# Patient Record
Sex: FEMALE | Race: WHITE | NOT HISPANIC OR LATINO | Employment: FULL TIME | URBAN - METROPOLITAN AREA
[De-identification: names, ages, dates, MRNs, and addresses within clinical notes are randomized per-mention and may not be internally consistent; named-entity substitution may affect disease eponyms.]

---

## 2019-12-10 ENCOUNTER — EH NON-PROVIDER (OUTPATIENT)
Dept: OCCUPATIONAL MEDICINE | Facility: CLINIC | Age: 29
End: 2019-12-10

## 2019-12-10 ENCOUNTER — EMPLOYEE HEALTH (OUTPATIENT)
Dept: OCCUPATIONAL MEDICINE | Facility: CLINIC | Age: 29
End: 2019-12-10

## 2019-12-10 ENCOUNTER — HOSPITAL ENCOUNTER (OUTPATIENT)
Facility: MEDICAL CENTER | Age: 29
End: 2019-12-10
Attending: PREVENTIVE MEDICINE
Payer: COMMERCIAL

## 2019-12-10 VITALS
RESPIRATION RATE: 16 BRPM | OXYGEN SATURATION: 99 % | DIASTOLIC BLOOD PRESSURE: 68 MMHG | HEART RATE: 74 BPM | TEMPERATURE: 97.7 F | HEIGHT: 64 IN | BODY MASS INDEX: 20.25 KG/M2 | SYSTOLIC BLOOD PRESSURE: 122 MMHG | WEIGHT: 118.6 LBS

## 2019-12-10 DIAGNOSIS — Z02.1 PRE-EMPLOYMENT HEALTH SCREENING EXAMINATION: ICD-10-CM

## 2019-12-10 DIAGNOSIS — Z02.1 PRE-EMPLOYMENT DRUG SCREENING: ICD-10-CM

## 2019-12-10 DIAGNOSIS — Z02.1 PRE-EMPLOYMENT DRUG SCREENING: Primary | ICD-10-CM

## 2019-12-10 LAB
AMP AMPHETAMINE: NORMAL
BAR BARBITURATES: NORMAL
BZO BENZODIAZEPINES: NORMAL
COC COCAINE: NORMAL
INT CON NEG: NORMAL
INT CON POS: NORMAL
MDMA ECSTASY: NORMAL
MET METHAMPHETAMINES: NORMAL
MTD METHADONE: NORMAL
OPI OPIATES: NORMAL
OXY OXYCODONE: NORMAL
PCP PHENCYCLIDINE: NORMAL
POC URINE DRUG SCREEN OCDRS: NORMAL
THC: NORMAL

## 2019-12-10 PROCEDURE — 80305 DRUG TEST PRSMV DIR OPT OBS: CPT | Performed by: PREVENTIVE MEDICINE

## 2019-12-10 PROCEDURE — 86480 TB TEST CELL IMMUN MEASURE: CPT | Performed by: PREVENTIVE MEDICINE

## 2019-12-10 PROCEDURE — 8915 PR COMPREHENSIVE PHYSICAL: Performed by: PREVENTIVE MEDICINE

## 2019-12-10 RX ORDER — ACETAMINOPHEN AND CODEINE PHOSPHATE 120; 12 MG/5ML; MG/5ML
SOLUTION ORAL
Refills: 0 | Status: ON HOLD | COMMUNITY
Start: 2019-10-15 | End: 2024-02-15

## 2019-12-10 RX ORDER — LEVOTHYROXINE SODIUM 0.05 MG/1
TABLET ORAL
Refills: 0 | Status: ON HOLD | COMMUNITY
Start: 2019-11-10 | End: 2024-02-15

## 2019-12-10 SDOH — HEALTH STABILITY: MENTAL HEALTH: HOW OFTEN DO YOU HAVE A DRINK CONTAINING ALCOHOL?: NEVER

## 2019-12-20 LAB — TEST NAME 95000: NORMAL

## 2019-12-23 ENCOUNTER — EH NON-PROVIDER (OUTPATIENT)
Dept: OCCUPATIONAL MEDICINE | Facility: CLINIC | Age: 29
End: 2019-12-23

## 2019-12-23 DIAGNOSIS — Z71.85 IMMUNIZATION COUNSELING: ICD-10-CM

## 2020-08-08 ENCOUNTER — HOSPITAL ENCOUNTER (OUTPATIENT)
Dept: LAB | Facility: MEDICAL CENTER | Age: 30
End: 2020-08-08
Payer: COMMERCIAL

## 2020-08-08 LAB
BDY FAT % MEASURED: 17 %
BP DIAS: 80 MMHG
BP SYS: 110 MMHG
CHOLEST SERPL-MCNC: 167 MG/DL (ref 100–199)
DIABETES HTDIA: NO
EVENT NAME HTEVT: NORMAL
FASTING HTFAS: YES
GLUCOSE SERPL-MCNC: 82 MG/DL (ref 65–99)
HDLC SERPL-MCNC: 81 MG/DL
HYPERTENSION HTHYP: NO
LDLC SERPL CALC-MCNC: 80 MG/DL
SCREENING LOC CITY HTCIT: NORMAL
SCREENING LOC STATE HTSTA: NORMAL
SCREENING LOCATION HTLOC: NORMAL
SMOKING HTSMO: NO
SUBSCRIBER ID HTSID: NORMAL
TRIGL SERPL-MCNC: 30 MG/DL (ref 0–149)

## 2020-08-08 PROCEDURE — 36415 COLL VENOUS BLD VENIPUNCTURE: CPT

## 2020-08-08 PROCEDURE — S5190 WELLNESS ASSESSMENT BY NONPH: HCPCS

## 2020-08-08 PROCEDURE — 80061 LIPID PANEL: CPT

## 2020-08-08 PROCEDURE — 82947 ASSAY GLUCOSE BLOOD QUANT: CPT

## 2020-09-25 ENCOUNTER — IMMUNIZATION (OUTPATIENT)
Dept: OCCUPATIONAL MEDICINE | Facility: CLINIC | Age: 30
End: 2020-09-25

## 2020-09-25 DIAGNOSIS — Z23 NEED FOR VACCINATION: ICD-10-CM

## 2020-09-25 PROCEDURE — 90686 IIV4 VACC NO PRSV 0.5 ML IM: CPT | Performed by: NURSE PRACTITIONER

## 2020-12-20 ENCOUNTER — APPOINTMENT (OUTPATIENT)
Dept: FAMILY PLANNING/WOMEN'S HEALTH CLINIC | Facility: IMMUNIZATION CENTER | Age: 30
End: 2020-12-20
Attending: FAMILY MEDICINE
Payer: COMMERCIAL

## 2020-12-20 DIAGNOSIS — Z23 NEED FOR VACCINATION: ICD-10-CM

## 2020-12-20 PROCEDURE — 0001A PFIZER SARS-COV-2 VACCINE: CPT

## 2020-12-20 PROCEDURE — 91300 PFIZER SARS-COV-2 VACCINE: CPT

## 2020-12-21 ENCOUNTER — IMMUNIZATION (OUTPATIENT)
Dept: FAMILY PLANNING/WOMEN'S HEALTH CLINIC | Facility: IMMUNIZATION CENTER | Age: 30
End: 2020-12-21
Payer: COMMERCIAL

## 2020-12-21 DIAGNOSIS — Z23 ENCOUNTER FOR VACCINATION: Primary | ICD-10-CM

## 2021-01-10 ENCOUNTER — IMMUNIZATION (OUTPATIENT)
Dept: FAMILY PLANNING/WOMEN'S HEALTH CLINIC | Facility: IMMUNIZATION CENTER | Age: 31
End: 2021-01-10
Attending: FAMILY MEDICINE
Payer: COMMERCIAL

## 2021-01-10 DIAGNOSIS — Z23 ENCOUNTER FOR VACCINATION: Primary | ICD-10-CM

## 2021-01-10 PROCEDURE — 0002A PFIZER SARS-COV-2 VACCINE: CPT

## 2021-01-10 PROCEDURE — 91300 PFIZER SARS-COV-2 VACCINE: CPT

## 2021-01-18 ENCOUNTER — HOSPITAL ENCOUNTER (OUTPATIENT)
Dept: LAB | Facility: MEDICAL CENTER | Age: 31
End: 2021-01-18
Attending: FAMILY MEDICINE
Payer: COMMERCIAL

## 2021-01-18 LAB
T4 FREE SERPL-MCNC: 1.52 NG/DL (ref 0.93–1.7)
TSH SERPL DL<=0.005 MIU/L-ACNC: 0.75 UIU/ML (ref 0.38–5.33)

## 2021-01-18 PROCEDURE — 84443 ASSAY THYROID STIM HORMONE: CPT

## 2021-01-18 PROCEDURE — 84439 ASSAY OF FREE THYROXINE: CPT

## 2021-01-18 PROCEDURE — 36415 COLL VENOUS BLD VENIPUNCTURE: CPT

## 2021-04-30 ENCOUNTER — HOSPITAL ENCOUNTER (OUTPATIENT)
Dept: LAB | Facility: MEDICAL CENTER | Age: 31
End: 2021-04-30
Attending: FAMILY MEDICINE
Payer: COMMERCIAL

## 2021-04-30 LAB
T4 FREE SERPL-MCNC: 1.32 NG/DL (ref 0.93–1.7)
TSH SERPL DL<=0.005 MIU/L-ACNC: 1.53 UIU/ML (ref 0.38–5.33)

## 2021-04-30 PROCEDURE — 36415 COLL VENOUS BLD VENIPUNCTURE: CPT

## 2021-04-30 PROCEDURE — 84439 ASSAY OF FREE THYROXINE: CPT

## 2021-04-30 PROCEDURE — 84443 ASSAY THYROID STIM HORMONE: CPT

## 2021-09-24 ENCOUNTER — IMMUNIZATION (OUTPATIENT)
Dept: OCCUPATIONAL MEDICINE | Facility: CLINIC | Age: 31
End: 2021-09-24
Payer: COMMERCIAL

## 2021-09-24 DIAGNOSIS — Z23 NEED FOR VACCINATION: Primary | ICD-10-CM

## 2021-09-24 PROCEDURE — 90686 IIV4 VACC NO PRSV 0.5 ML IM: CPT | Performed by: NURSE PRACTITIONER

## 2023-10-11 ENCOUNTER — ANCILLARY PROCEDURE (OUTPATIENT)
Dept: MATERNAL FETAL MEDICINE | Facility: MEDICAL CENTER | Age: 33
End: 2023-10-11
Payer: COMMERCIAL

## 2023-10-11 VITALS — SYSTOLIC BLOOD PRESSURE: 125 MMHG | BODY MASS INDEX: 23.48 KG/M2 | DIASTOLIC BLOOD PRESSURE: 77 MMHG | WEIGHT: 136.8 LBS

## 2023-10-11 DIAGNOSIS — Z3A.23 23 WEEKS GESTATION OF PREGNANCY: ICD-10-CM

## 2023-10-11 DIAGNOSIS — O35.BXX0 FETAL CARDIAC ANOMALY COMPLICATING PREGNANCY, ANTEPARTUM, NOT APPLICABLE OR UNSPECIFIED FETUS: ICD-10-CM

## 2023-10-11 DIAGNOSIS — O28.3 ABNORMAL FETAL ULTRASOUND: ICD-10-CM

## 2023-10-11 PROCEDURE — 76817 TRANSVAGINAL US OBSTETRIC: CPT | Performed by: OBSTETRICS & GYNECOLOGY

## 2023-10-11 PROCEDURE — 76811 OB US DETAILED SNGL FETUS: CPT | Performed by: OBSTETRICS & GYNECOLOGY

## 2023-10-11 PROCEDURE — 99202 OFFICE O/P NEW SF 15 MIN: CPT | Performed by: OBSTETRICS & GYNECOLOGY

## 2024-01-24 ENCOUNTER — HOSPITAL ENCOUNTER (OUTPATIENT)
Facility: MEDICAL CENTER | Age: 34
End: 2024-01-24
Payer: COMMERCIAL

## 2024-02-14 ENCOUNTER — HOSPITAL ENCOUNTER (INPATIENT)
Facility: MEDICAL CENTER | Age: 34
LOS: 1 days | End: 2024-02-15
Attending: FAMILY MEDICINE | Admitting: FAMILY MEDICINE
Payer: COMMERCIAL

## 2024-02-14 ENCOUNTER — ANESTHESIA (OUTPATIENT)
Dept: ANESTHESIOLOGY | Facility: MEDICAL CENTER | Age: 34
End: 2024-02-14
Payer: COMMERCIAL

## 2024-02-14 ENCOUNTER — ANESTHESIA EVENT (OUTPATIENT)
Dept: ANESTHESIOLOGY | Facility: MEDICAL CENTER | Age: 34
End: 2024-02-14
Payer: COMMERCIAL

## 2024-02-14 LAB
BASOPHILS # BLD AUTO: 0.3 % (ref 0–1.8)
BASOPHILS # BLD: 0.04 K/UL (ref 0–0.12)
EOSINOPHIL # BLD AUTO: 0.06 K/UL (ref 0–0.51)
EOSINOPHIL NFR BLD: 0.5 % (ref 0–6.9)
ERYTHROCYTE [DISTWIDTH] IN BLOOD BY AUTOMATED COUNT: 43.4 FL (ref 35.9–50)
ERYTHROCYTE [DISTWIDTH] IN BLOOD BY AUTOMATED COUNT: 43.9 FL (ref 35.9–50)
HCT VFR BLD AUTO: 34.2 % (ref 37–47)
HCT VFR BLD AUTO: 38.8 % (ref 37–47)
HGB BLD-MCNC: 12.1 G/DL (ref 12–16)
HGB BLD-MCNC: 13.5 G/DL (ref 12–16)
HOLDING TUBE BB 8507: NORMAL
IMM GRANULOCYTES # BLD AUTO: 0.1 K/UL (ref 0–0.11)
IMM GRANULOCYTES NFR BLD AUTO: 0.9 % (ref 0–0.9)
LYMPHOCYTES # BLD AUTO: 1.92 K/UL (ref 1–4.8)
LYMPHOCYTES NFR BLD: 16.7 % (ref 22–41)
MCH RBC QN AUTO: 32.1 PG (ref 27–33)
MCH RBC QN AUTO: 33 PG (ref 27–33)
MCHC RBC AUTO-ENTMCNC: 34.8 G/DL (ref 32.2–35.5)
MCHC RBC AUTO-ENTMCNC: 35.4 G/DL (ref 32.2–35.5)
MCV RBC AUTO: 92.2 FL (ref 81.4–97.8)
MCV RBC AUTO: 93.2 FL (ref 81.4–97.8)
MONOCYTES # BLD AUTO: 0.82 K/UL (ref 0–0.85)
MONOCYTES NFR BLD AUTO: 7.1 % (ref 0–13.4)
NEUTROPHILS # BLD AUTO: 8.59 K/UL (ref 1.82–7.42)
NEUTROPHILS NFR BLD: 74.5 % (ref 44–72)
NRBC # BLD AUTO: 0 K/UL
NRBC BLD-RTO: 0 /100 WBC (ref 0–0.2)
PLATELET # BLD AUTO: 179 K/UL (ref 164–446)
PLATELET # BLD AUTO: 198 K/UL (ref 164–446)
PMV BLD AUTO: 10.2 FL (ref 9–12.9)
PMV BLD AUTO: 10.2 FL (ref 9–12.9)
RBC # BLD AUTO: 3.67 M/UL (ref 4.2–5.4)
RBC # BLD AUTO: 4.21 M/UL (ref 4.2–5.4)
T PALLIDUM AB SER QL IA: NORMAL
WBC # BLD AUTO: 11.5 K/UL (ref 4.8–10.8)
WBC # BLD AUTO: 14.2 K/UL (ref 4.8–10.8)

## 2024-02-14 PROCEDURE — 85027 COMPLETE CBC AUTOMATED: CPT

## 2024-02-14 PROCEDURE — A9270 NON-COVERED ITEM OR SERVICE: HCPCS | Performed by: NURSE PRACTITIONER

## 2024-02-14 PROCEDURE — 59409 OBSTETRICAL CARE: CPT | Performed by: NURSE PRACTITIONER

## 2024-02-14 PROCEDURE — 700101 HCHG RX REV CODE 250: Performed by: ANESTHESIOLOGY

## 2024-02-14 PROCEDURE — 36415 COLL VENOUS BLD VENIPUNCTURE: CPT

## 2024-02-14 PROCEDURE — 700111 HCHG RX REV CODE 636 W/ 250 OVERRIDE (IP): Mod: JZ | Performed by: ANESTHESIOLOGY

## 2024-02-14 PROCEDURE — 0KQM0ZZ REPAIR PERINEUM MUSCLE, OPEN APPROACH: ICD-10-PCS | Performed by: FAMILY MEDICINE

## 2024-02-14 PROCEDURE — 700105 HCHG RX REV CODE 258: Performed by: FAMILY MEDICINE

## 2024-02-14 PROCEDURE — 700102 HCHG RX REV CODE 250 W/ 637 OVERRIDE(OP): Performed by: NURSE PRACTITIONER

## 2024-02-14 PROCEDURE — 700111 HCHG RX REV CODE 636 W/ 250 OVERRIDE (IP): Performed by: FAMILY MEDICINE

## 2024-02-14 PROCEDURE — 304965 HCHG RECOVERY SERVICES

## 2024-02-14 PROCEDURE — 700111 HCHG RX REV CODE 636 W/ 250 OVERRIDE (IP): Performed by: ANESTHESIOLOGY

## 2024-02-14 PROCEDURE — 770002 HCHG ROOM/CARE - OB PRIVATE (112)

## 2024-02-14 PROCEDURE — 85025 COMPLETE CBC W/AUTO DIFF WBC: CPT

## 2024-02-14 PROCEDURE — 303615 HCHG EPIDURAL/SPINAL ANESTHESIA FOR LABOR

## 2024-02-14 PROCEDURE — 59409 OBSTETRICAL CARE: CPT

## 2024-02-14 PROCEDURE — 86780 TREPONEMA PALLIDUM: CPT

## 2024-02-14 RX ORDER — SODIUM CHLORIDE, SODIUM LACTATE, POTASSIUM CHLORIDE, CALCIUM CHLORIDE 600; 310; 30; 20 MG/100ML; MG/100ML; MG/100ML; MG/100ML
INJECTION, SOLUTION INTRAVENOUS CONTINUOUS
Status: DISCONTINUED | OUTPATIENT
Start: 2024-02-14 | End: 2024-02-15 | Stop reason: HOSPADM

## 2024-02-14 RX ORDER — CARBOPROST TROMETHAMINE 250 UG/ML
250 INJECTION, SOLUTION INTRAMUSCULAR
Status: DISCONTINUED | OUTPATIENT
Start: 2024-02-14 | End: 2024-02-15 | Stop reason: HOSPADM

## 2024-02-14 RX ORDER — ROPIVACAINE HYDROCHLORIDE 2 MG/ML
INJECTION, SOLUTION EPIDURAL; INFILTRATION; PERINEURAL
Status: ACTIVE
Start: 2024-02-14 | End: 2024-02-14

## 2024-02-14 RX ORDER — MISOPROSTOL 200 UG/1
600 TABLET ORAL
Status: DISCONTINUED | OUTPATIENT
Start: 2024-02-14 | End: 2024-02-15 | Stop reason: HOSPADM

## 2024-02-14 RX ORDER — ROPIVACAINE HYDROCHLORIDE 2 MG/ML
INJECTION, SOLUTION EPIDURAL; INFILTRATION; PERINEURAL CONTINUOUS
Status: DISCONTINUED | OUTPATIENT
Start: 2024-02-14 | End: 2024-02-15 | Stop reason: HOSPADM

## 2024-02-14 RX ORDER — ONDANSETRON 2 MG/ML
4 INJECTION INTRAMUSCULAR; INTRAVENOUS EVERY 6 HOURS PRN
Status: DISCONTINUED | OUTPATIENT
Start: 2024-02-14 | End: 2024-02-14 | Stop reason: HOSPADM

## 2024-02-14 RX ORDER — LIDOCAINE HYDROCHLORIDE 10 MG/ML
20 INJECTION, SOLUTION INFILTRATION; PERINEURAL
Status: DISCONTINUED | OUTPATIENT
Start: 2024-02-14 | End: 2024-02-14 | Stop reason: HOSPADM

## 2024-02-14 RX ORDER — IBUPROFEN 800 MG/1
800 TABLET ORAL
Status: DISCONTINUED | OUTPATIENT
Start: 2024-02-14 | End: 2024-02-14 | Stop reason: HOSPADM

## 2024-02-14 RX ORDER — IBUPROFEN 800 MG/1
800 TABLET ORAL EVERY 8 HOURS PRN
Status: DISCONTINUED | OUTPATIENT
Start: 2024-02-14 | End: 2024-02-15 | Stop reason: HOSPADM

## 2024-02-14 RX ORDER — BUPIVACAINE HYDROCHLORIDE 2.5 MG/ML
INJECTION, SOLUTION EPIDURAL; INFILTRATION; INTRACAUDAL
Status: ACTIVE
Start: 2024-02-14 | End: 2024-02-14

## 2024-02-14 RX ORDER — ACETAMINOPHEN 500 MG
1000 TABLET ORAL
Status: DISCONTINUED | OUTPATIENT
Start: 2024-02-14 | End: 2024-02-14 | Stop reason: HOSPADM

## 2024-02-14 RX ORDER — BUPIVACAINE HYDROCHLORIDE 2.5 MG/ML
INJECTION, SOLUTION EPIDURAL; INFILTRATION; INTRACAUDAL
Status: COMPLETED | OUTPATIENT
Start: 2024-02-14 | End: 2024-02-14

## 2024-02-14 RX ORDER — LIDOCAINE HYDROCHLORIDE AND EPINEPHRINE 15; 5 MG/ML; UG/ML
INJECTION, SOLUTION EPIDURAL
Status: COMPLETED | OUTPATIENT
Start: 2024-02-14 | End: 2024-02-14

## 2024-02-14 RX ORDER — SODIUM CHLORIDE, SODIUM LACTATE, POTASSIUM CHLORIDE, AND CALCIUM CHLORIDE .6; .31; .03; .02 G/100ML; G/100ML; G/100ML; G/100ML
250 INJECTION, SOLUTION INTRAVENOUS PRN
Status: DISCONTINUED | OUTPATIENT
Start: 2024-02-14 | End: 2024-02-14 | Stop reason: HOSPADM

## 2024-02-14 RX ORDER — METHYLERGONOVINE MALEATE 0.2 MG/ML
0.2 INJECTION INTRAVENOUS
Status: DISCONTINUED | OUTPATIENT
Start: 2024-02-14 | End: 2024-02-15 | Stop reason: HOSPADM

## 2024-02-14 RX ORDER — ONDANSETRON 4 MG/1
4 TABLET, ORALLY DISINTEGRATING ORAL EVERY 6 HOURS PRN
Status: DISCONTINUED | OUTPATIENT
Start: 2024-02-14 | End: 2024-02-14 | Stop reason: HOSPADM

## 2024-02-14 RX ORDER — SODIUM CHLORIDE, SODIUM LACTATE, POTASSIUM CHLORIDE, CALCIUM CHLORIDE 600; 310; 30; 20 MG/100ML; MG/100ML; MG/100ML; MG/100ML
INJECTION, SOLUTION INTRAVENOUS PRN
Status: DISCONTINUED | OUTPATIENT
Start: 2024-02-14 | End: 2024-02-15 | Stop reason: HOSPADM

## 2024-02-14 RX ORDER — OXYTOCIN 10 [USP'U]/ML
10 INJECTION, SOLUTION INTRAMUSCULAR; INTRAVENOUS
Status: DISCONTINUED | OUTPATIENT
Start: 2024-02-14 | End: 2024-02-14 | Stop reason: HOSPADM

## 2024-02-14 RX ORDER — TERBUTALINE SULFATE 1 MG/ML
0.25 INJECTION, SOLUTION SUBCUTANEOUS
Status: DISCONTINUED | OUTPATIENT
Start: 2024-02-14 | End: 2024-02-14 | Stop reason: HOSPADM

## 2024-02-14 RX ORDER — EPHEDRINE SULFATE 50 MG/ML
5 INJECTION, SOLUTION INTRAVENOUS
Status: DISCONTINUED | OUTPATIENT
Start: 2024-02-14 | End: 2024-02-14 | Stop reason: HOSPADM

## 2024-02-14 RX ORDER — DOCUSATE SODIUM 100 MG/1
100 CAPSULE, LIQUID FILLED ORAL 2 TIMES DAILY PRN
Status: DISCONTINUED | OUTPATIENT
Start: 2024-02-14 | End: 2024-02-15 | Stop reason: HOSPADM

## 2024-02-14 RX ORDER — ACETAMINOPHEN 500 MG
1000 TABLET ORAL EVERY 6 HOURS PRN
Status: DISCONTINUED | OUTPATIENT
Start: 2024-02-14 | End: 2024-02-15 | Stop reason: HOSPADM

## 2024-02-14 RX ORDER — SODIUM CHLORIDE, SODIUM LACTATE, POTASSIUM CHLORIDE, AND CALCIUM CHLORIDE .6; .31; .03; .02 G/100ML; G/100ML; G/100ML; G/100ML
1000 INJECTION, SOLUTION INTRAVENOUS
Status: DISCONTINUED | OUTPATIENT
Start: 2024-02-14 | End: 2024-02-14 | Stop reason: HOSPADM

## 2024-02-14 RX ADMIN — IBUPROFEN 800 MG: 800 TABLET, FILM COATED ORAL at 09:41

## 2024-02-14 RX ADMIN — OXYTOCIN 20 UNITS: 10 INJECTION, SOLUTION INTRAMUSCULAR; INTRAVENOUS at 06:50

## 2024-02-14 RX ADMIN — BUPIVACAINE HYDROCHLORIDE 7 ML: 2.5 INJECTION, SOLUTION EPIDURAL; INFILTRATION; INTRACAUDAL at 02:30

## 2024-02-14 RX ADMIN — ROPIVACAINE HYDROCHLORIDE: 2 INJECTION, SOLUTION EPIDURAL; INFILTRATION at 02:30

## 2024-02-14 RX ADMIN — IBUPROFEN 800 MG: 800 TABLET, FILM COATED ORAL at 17:55

## 2024-02-14 RX ADMIN — LIDOCAINE HYDROCHLORIDE,EPINEPHRINE BITARTRATE 5 ML: 15; .005 INJECTION, SOLUTION EPIDURAL; INFILTRATION; INTRACAUDAL; PERINEURAL at 02:30

## 2024-02-14 ASSESSMENT — EDINBURGH POSTNATAL DEPRESSION SCALE (EPDS)
THINGS HAVE BEEN GETTING ON TOP OF ME: NO, I HAVE BEEN COPING AS WELL AS EVER
I HAVE BEEN SO UNHAPPY THAT I HAVE BEEN CRYING: NO, NEVER
I HAVE BLAMED MYSELF UNNECESSARILY WHEN THINGS WENT WRONG: NOT VERY OFTEN
I HAVE FELT SCARED OR PANICKY FOR NO GOOD REASON: NO, NOT AT ALL
I HAVE FELT SAD OR MISERABLE: NO, NOT AT ALL
I HAVE BEEN SO UNHAPPY THAT I HAVE HAD DIFFICULTY SLEEPING: NOT AT ALL
I HAVE BEEN ABLE TO LAUGH AND SEE THE FUNNY SIDE OF THINGS: AS MUCH AS I ALWAYS COULD
I HAVE LOOKED FORWARD WITH ENJOYMENT TO THINGS: AS MUCH AS I EVER DID
I HAVE BEEN ANXIOUS OR WORRIED FOR NO GOOD REASON: HARDLY EVER
THE THOUGHT OF HARMING MYSELF HAS OCCURRED TO ME: NEVER

## 2024-02-14 ASSESSMENT — LIFESTYLE VARIABLES
TOTAL SCORE: 0
HAVE YOU EVER FELT YOU SHOULD CUT DOWN ON YOUR DRINKING: NO
TOTAL SCORE: 0
EVER_SMOKED: NEVER
EVER FELT BAD OR GUILTY ABOUT YOUR DRINKING: NO
EVER HAD A DRINK FIRST THING IN THE MORNING TO STEADY YOUR NERVES TO GET RID OF A HANGOVER: NO
TOTAL SCORE: 0
CONSUMPTION TOTAL: INCOMPLETE
HAVE PEOPLE ANNOYED YOU BY CRITICIZING YOUR DRINKING: NO
ALCOHOL_USE: NO

## 2024-02-14 ASSESSMENT — PAIN DESCRIPTION - PAIN TYPE
TYPE: ACUTE PAIN

## 2024-02-14 ASSESSMENT — PATIENT HEALTH QUESTIONNAIRE - PHQ9
2. FEELING DOWN, DEPRESSED, IRRITABLE, OR HOPELESS: NOT AT ALL
1. LITTLE INTEREST OR PLEASURE IN DOING THINGS: NOT AT ALL
SUM OF ALL RESPONSES TO PHQ9 QUESTIONS 1 AND 2: 0

## 2024-02-14 NOTE — PROGRESS NOTES
0835-Assumed care from YIN Posadas.    0844-Transported pt to postpartum in stable condition via wheelchair. Bedside report given to YIN Jiménez. ID bands verified x2 with two RNs.

## 2024-02-14 NOTE — CARE PLAN
The patient is Stable - Low risk of patient condition declining or worsening    Shift Goals  Clinical Goals: Lochia WDL and pain management    Progress made toward(s) clinical / shift goals:      Problem: Psychosocial - Postpartum  Goal: Patient will verbalize and demonstrate effective bonding and parenting behavior  Outcome: Progressing  Pt interacting with infant appropriately and completing infant cares with FOB at bedside.     Problem: Altered Physiologic Condition  Goal: Patient physiologically stable as evidenced by normal lochia, palpable uterine involution and vitals within normal limits  Outcome: Progressing  Pt maintaining firm fundus and lochia WDL      Patient is not progressing towards the following goals:

## 2024-02-14 NOTE — L&D DELIVERY NOTE
Kusum Wade is a 33 y.o. female admitted for active labor.  Pt had prenatal care in [place]. Was seen by MFM in [place].     VAGINAL DELIVERY NOTE:      VE on admission: /0  Labor course: Luis bleeding with 5-6 cm clot with onset of 2nd stage. Bleeding stopped and labor progressed with effective pushing    OB History    Para Term  AB Living   1             SAB IAB Ectopic Molar Multiple Live Births                    # Outcome Date GA Lbr Vick/2nd Weight Sex Delivery Anes PTL Lv   1 Current                Weeks gestation: 41w0d  Diagnosis: , now    GBS: negative    At 06:45,  of viable female infant over an intact perineum. Infant head delivered in [place] with restitution to [facility]. Nuchal cord present: no. Shoulders and rest of body delivered uneventfully with slight downward traction. Cord was delayed for 120 seconds, then clamped and cut when pulsations ceased.   Apgars: 8/9  Birth weight:pending skin to skin transition    Placenta: spontaneous and intact with 3 VC at 06:50    Laceration: 2nd degree perineal    Anesthesia: Epidural  Excellent hemostasis  EBL: 300 ml    Sponge and needle counts correct: yes    Tolerated procedure well  Patient and infant will be transferred to postpartum unit to recover    Caitlyn Pimentel CNM, APRN  Dr Lares is the attending MD today

## 2024-02-14 NOTE — CARE PLAN
The patient is Stable - Low risk of patient condition declining or worsening    Shift Goals  Clinical Goals: pain management  Patient Goals: healthy mom  Family Goals: healthy baby    Progress made toward(s) clinical / shift goals:    Problem: Knowledge Deficit - L&D  Goal: Patient and family/caregivers will demonstrate understanding of plan of care, disease process/condition, diagnostic tests and medications  Outcome: Progressing     Problem: Pain  Goal: Patient's pain will be alleviated or reduced to the patient’s comfort goal  Outcome: Progressing       Patient is not progressing towards the following goals:

## 2024-02-14 NOTE — PROGRESS NOTES
0845 - Patient transferred from labor and delivery in wheelchair with infant in arms and FOB accompanying with belongings. Two RN verification of infant and parent armbands. Report received from Sylvia ESQUEDA. Patient oriented to unit and POC. Assessment completed, fundus firm and lochia WDL. Patient has already voided and is doing so without problems. Pain management discussed. Second bag of pitocin infusing at 125 ml/hr. IV patent, no s/s of infiltration at insertion site. Parents educated to offer feedings on cue, at minimum of Q3 hours. Parents verbalize understanding and will update infant I&O chart. Parents educated on room, medications, safe sleep, emergency cord, and infant care. Patient encouraged to call with needs. Call light within reach. All concerns and questions answered at this time.

## 2024-02-14 NOTE — PROGRESS NOTES
"S: Called to bedside to assess bleeding during 2nd stage    O:    Vitals:    02/14/24 0300 02/14/24 0316   BP:  125/88   Pulse:  86   Resp:  16   Temp:  36.8 °C (98.2 °F)   TempSrc:  Temporal   SpO2:  97%   Weight: 72.6 kg (160 lb)    Height: 1.626 m (5' 4\")            FHTs:  Baseline 130, + accels, - decels, moderate variability        [employer]: Contractions q 2-3 minutes, firm to palpation        SVE: C/P        5 cm clot with starla blood on pad, approx 200 ml.   Consult with Dr. Luda Dr. At  to assess    A/P:  1.  IUP @ 41w0d            2.  Cat 1 FHTs             3.  Continue pushing                 Caitlyn Pimentel CNM, APRN    "

## 2024-02-14 NOTE — PROGRESS NOTES
0697 Report received from YIN Barros. Care assumed at this time. Patient currently holding infant skin to skin. Caitlyn Pimentel CNM at bedside performing after delivery repairs to perineum. Patient has no complaints of pain at this time.     0835  Pt up to bathroom. Voided adequately. Pericare done, gown changed. Pt educated on lochia and bleeding expectation. Report given to YIN Ward. Transfer of care at this time.

## 2024-02-14 NOTE — LACTATION NOTE
This note was copied from a baby's chart.  Mom is a 34 y/o P1 who delivered baby girl weighing 7 # 5.8 oz at 41 wks. Mom reports breast changes during pregnancy. Mom denies any breast surgeries, diabetes, thyroid or fertility issues. Mom reports baby just finished feeding and states he fed for over 15 minutes. LC reviewed demand feeds of 8 or more times in 24 hours. LC encouraged STS  during waking hours and to observe for early feeding cues. LC discussed feeding on both breast at each feed.  Taught to be aware that periods of cluster feeding are normal. Note rhythmic, effective jaw glide   LC reviewed changes in output over next several days.   LC encouraged mother to call for assistance at next feeding.  Mom has a pump and BC/BS insurance and received PNC in [place].

## 2024-02-14 NOTE — H&P
History and Physical      Kusum Wade is a 33 y.o. year old female  at 41w0d who presents for CTX    No OB provider in area. Has prenatal care in [place]    Subjective:   positive  For CTXS.   positive Feels pain   positive for LOF  negative for vaginal bleeding.   positive for fetal movement    ROS: Pertinent items are noted in HPI.    No past medical history on file.  No past surgical history on file.  OB History    Para Term  AB Living   1             SAB IAB Ectopic Molar Multiple Live Births                    # Outcome Date GA Lbr Vick/2nd Weight Sex Delivery Anes PTL Lv   1 Current              Social History     Socioeconomic History    Marital status:      Spouse name: Not on file    Number of children: Not on file    Years of education: Not on file    Highest education level: Not on file   Occupational History    Not on file   Tobacco Use    Smoking status: Never    Smokeless tobacco: Never   Vaping Use    Vaping Use: Never used   Substance and Sexual Activity    Alcohol use: Never    Drug use: Never    Sexual activity: Not on file   Other Topics Concern    Not on file   Social History Narrative    Not on file     Social Determinants of Health     Financial Resource Strain: Not on file   Food Insecurity: Not on file   Transportation Needs: Not on file   Physical Activity: Not on file   Stress: Not on file   Social Connections: Not on file   Intimate Partner Violence: Not on file   Housing Stability: Not on file     Allergies: Patient has no allergy information on record.    Current Facility-Administered Medications:     LR infusion, , Intravenous, Continuous, Lorelei Lares M.D.    lidocaine (XYLOCAINE) 1%  injection, 20 mL, Subcutaneous, Once PRN, Lorelei Lares M.D.    terbutaline (Brethine) injection 0.25 mg, 0.25 mg, Subcutaneous, Once PRN, Lorelei Lares M.D.    oxytocin (Pitocin) infusion bolus (for post delivery), 20 Units, Intravenous, Once **FOLLOWED BY** oxytocin  "(Pitocin) infusion (for post delivery), 125 mL/hr, Intravenous, Continuous, Lorelei Lares M.D.    oxytocin (Pitocin) injection 10 Units, 10 Units, Intramuscular, Once PRN, Lorelei Lares M.D.    ibuprofen (Motrin) tablet 800 mg, 800 mg, Oral, Once PRN, Lorelei Lares M.D.    acetaminophen (Tylenol) tablet 1,000 mg, 1,000 mg, Oral, Once PRN, Lorelei Lares M.D.    ondansetron (Zofran ODT) dispertab 4 mg, 4 mg, Oral, Q6HRS PRN **OR** ondansetron (Zofran) syringe/vial injection 4 mg, 4 mg, Intravenous, Q6HRS PRN, Lorelei Lares M.D.    Prenatal care in [place]     Patient Active Problem List    Diagnosis Date Noted    Indication for care in labor or delivery 02/14/2024         Objective:      /88   Pulse 86   Temp 36.8 °C (98.2 °F) (Temporal)   Resp 16   Ht 1.626 m (5' 4\")   Wt 72.6 kg (160 lb)   SpO2 97%     General:   no acute distress   Skin:   normal   HEENT:  PERRLA   Lungs:   CTA bilateral   Heart:   S1, S2 normal, no murmur, click, rub or gallop, regular rate and rhythm, brisk carotid upstroke without bruits, peripheral pulses very brisk, chest is clear without rales or wheezing, no pedal edema, no JVD, no hepatosplenomegaly   Abdomen:   gravid, NT   EFW:  1162-0167 g   Pelvis:  Exam deferred., proven to 0 g   FHTs: 135 BPM pos accels neg decels moderate variability   Contractions: Contractions q 2-3 min firm to palpation   Uterine Size: S=D   Presentations: Cephalic per RN   Cervix: Per RN    Dilation: 8-9    Effacement: 100%    Station:  0    Consistency: Soft    Position: Anterior     Complete OB US  Normal 23 wk US by MFM, in media    Lab Review  Lab:   Blood type: unknown     Recent Results (from the past 5880 hour(s))   Hold Blood Bank Specimen (Not Tested)    Collection Time: 02/14/24  3:25 AM   Result Value Ref Range    Holding Tube - Bb DONE    CBC with differential    Collection Time: 02/14/24  3:25 AM   Result Value Ref Range    WBC 11.5 (H) 4.8 - 10.8 K/uL    RBC 4.21 4.20 - 5.40 M/uL    " Hemoglobin 13.5 12.0 - 16.0 g/dL    Hematocrit 38.8 37.0 - 47.0 %    MCV 92.2 81.4 - 97.8 fL    MCH 32.1 27.0 - 33.0 pg    MCHC 34.8 32.2 - 35.5 g/dL    RDW 43.4 35.9 - 50.0 fL    Platelet Count 198 164 - 446 K/uL    MPV 10.2 9.0 - 12.9 fL    Neutrophils-Polys 74.50 (H) 44.00 - 72.00 %    Lymphocytes 16.70 (L) 22.00 - 41.00 %    Monocytes 7.10 0.00 - 13.40 %    Eosinophils 0.50 0.00 - 6.90 %    Basophils 0.30 0.00 - 1.80 %    Immature Granulocytes 0.90 0.00 - 0.90 %    Nucleated RBC 0.00 0.00 - 0.20 /100 WBC    Neutrophils (Absolute) 8.59 (H) 1.82 - 7.42 K/uL    Lymphs (Absolute) 1.92 1.00 - 4.80 K/uL    Monos (Absolute) 0.82 0.00 - 0.85 K/uL    Eos (Absolute) 0.06 0.00 - 0.51 K/uL    Baso (Absolute) 0.04 0.00 - 0.12 K/uL    Immature Granulocytes (abs) 0.10 0.00 - 0.11 K/uL    NRBC (Absolute) 0.00 K/uL        Assessment:   1.  IUP at 41w0d  2.  Labor status: Active phase labor.  3.  Cat 1 FHTs  4.  Obstetrical history significant for   Patient Active Problem List    Diagnosis Date Noted    Indication for care in labor or delivery 02/14/2024   .      Plan:     Admit to L&D  GBS negative  Routine labs  Pain management prn    Caitlyn Pimentel, SHWETA, APRN

## 2024-02-14 NOTE — ANESTHESIA PROCEDURE NOTES
Epidural Block    Date/Time: 2/14/2024 2:30 AM    Performed by: uJnior Baxter M.D.  Authorized by: Junior Baxter M.D.    Patient Location:  OB  Start Time:  2/14/2024 2:30 AM  Reason for Block: labor analgesia    patient identified, IV checked, site marked, risks and benefits discussed, surgical consent, monitors and equipment checked and pre-op evaluation    Patient Position:  Sitting  Prep: ChloraPrep, patient draped and sterile technique    Monitoring:  Blood pressure, continuous pulse oximetry and heart rate  Approach:  Midline  Location:  L3-L4  Injection Technique:  MACY saline  Skin infiltration:  Lidocaine  Strength:  1%  Dose:  3ml  Needle Type:  Tuohy  Needle Gauge:  17 G  Needle Length:  3.5 in  Loss of resistance::  4  Catheter Size:  19 G  Catheter at Skin Depth:  9  Test Dose Result:  Negative

## 2024-02-14 NOTE — ANESTHESIA PREPROCEDURE EVALUATION
Date: 02/14/24  Procedure: Labor Epidural         Relevant Problems   Other   (positive) Indication for care in labor or delivery       Physical Exam    Airway   Mallampati: II  TM distance: >3 FB  Neck ROM: full       Cardiovascular - normal exam  Rhythm: regular  Rate: normal  (-) murmur     Dental - normal exam           Pulmonary - normal exam  Breath sounds clear to auscultation     Abdominal    Neurological - normal exam                   Anesthesia Plan    ASA 2       Plan - epidural   Neuraxial block will be labor analgesia                  Pertinent diagnostic labs and testing reviewed    Informed Consent:    Anesthetic plan and risks discussed with patient.

## 2024-02-14 NOTE — PROGRESS NOTES
0152: Pt is transferred from OB triage to 218. EFM and TOCO applied. Pt confirms ctx since her membrane sweep yesterday where she was pronounced 3cm, no LOF, no VB, + FM. SVE today: 8/0. Caitlyn LOUIEM notified of pt arrival. Orders placed to admit pt. Pt is requesting epidural when she arrives on floor.     0214: Sahil ACUÑA called about placement of epidural    0231: Test dose completed    0357: First push, pt pushes palm sized clots. Caitlyn CNM called to evaluate    0410: Caitlyn CNM in room to evaluate.     0416: Caitlyn CNM pushes with the pt. CNM notices clots. Luda ACUÑA consulted to evaluate at bedside.     0420: Luda ACUÑA at bedside. MD pushing with pt. Clots decrease during pushing. Order to notify MD when clots increase during pushing.     0610: Caitlyn CNM back in room to evaluate, CNM gowns up.     0618: Ash ESQUEDA called for delivery    0645:  of viable baby girl.    0650: Gave report to Katharina ESQUEDA. POC discussed.

## 2024-02-15 ENCOUNTER — PHARMACY VISIT (OUTPATIENT)
Dept: PHARMACY | Facility: MEDICAL CENTER | Age: 34
End: 2024-02-15
Payer: COMMERCIAL

## 2024-02-15 VITALS
OXYGEN SATURATION: 96 % | BODY MASS INDEX: 27.31 KG/M2 | RESPIRATION RATE: 18 BRPM | WEIGHT: 160 LBS | HEART RATE: 77 BPM | SYSTOLIC BLOOD PRESSURE: 116 MMHG | TEMPERATURE: 98.9 F | DIASTOLIC BLOOD PRESSURE: 79 MMHG | HEIGHT: 64 IN

## 2024-02-15 PROCEDURE — RXMED WILLOW AMBULATORY MEDICATION CHARGE

## 2024-02-15 PROCEDURE — 700102 HCHG RX REV CODE 250 W/ 637 OVERRIDE(OP): Performed by: NURSE PRACTITIONER

## 2024-02-15 PROCEDURE — A9270 NON-COVERED ITEM OR SERVICE: HCPCS | Performed by: NURSE PRACTITIONER

## 2024-02-15 RX ORDER — IBUPROFEN 800 MG/1
800 TABLET ORAL EVERY 8 HOURS PRN
Qty: 30 TABLET | Refills: 0 | Status: SHIPPED | OUTPATIENT
Start: 2024-02-15

## 2024-02-15 RX ORDER — PSEUDOEPHEDRINE HCL 30 MG
100 TABLET ORAL 2 TIMES DAILY PRN
Qty: 60 CAPSULE | Refills: 0 | Status: SHIPPED | OUTPATIENT
Start: 2024-02-15

## 2024-02-15 RX ORDER — ACETAMINOPHEN 500 MG
1000 TABLET ORAL EVERY 6 HOURS PRN
Qty: 30 TABLET | Refills: 0 | Status: SHIPPED | OUTPATIENT
Start: 2024-02-15

## 2024-02-15 RX ADMIN — IBUPROFEN 800 MG: 800 TABLET, FILM COATED ORAL at 08:11

## 2024-02-15 ASSESSMENT — PAIN DESCRIPTION - PAIN TYPE
TYPE: ACUTE PAIN
TYPE: ACUTE PAIN

## 2024-02-15 ASSESSMENT — PAIN SCALES - GENERAL: PAIN_LEVEL: 2

## 2024-02-15 NOTE — DISCHARGE SUMMARY
Discharge Summary:     Date of Admission: 2024  Date of Discharge: 02/15/24      Admitting diagnosis:    1. Pregnancy @ 41w0d      Discharge Diagnosis:   1. Status post vaginal, spontaneous.      No past medical history on file.  OB History    Para Term  AB Living   1 1 1     1   SAB IAB Ectopic Molar Multiple Live Births           0 1      # Outcome Date GA Lbr Vick/2nd Weight Sex Delivery Anes PTL Lv   1 Term 24 41w0d / 03:15 3.34 kg (7 lb 5.8 oz) F Vag-Spont EPI N TAMARA     No past surgical history on file.  Patient has no allergy information on record.    Patient Active Problem List   Diagnosis   (none) - all problems resolved or deleted       Hospital Course:   Pt is a 33 y.o. now  who presented on 2024 in active labor. Patient birthed without pain medication. Patient progressed quickly without labor induction and single female infant was delivered via  at 41w0d. Apgars 8, 9 at 1 and 5 minutes respectively.  ml.     Postpartum course notable for early ambulation, well managed pain, tolerance of diet, spontaneous voiding, and appropriate feeding of infant. She has remained afebrile and blood pressure has been well controlled. All maternal questions and concerns addressed    Physical Exam:  Temp:  [36.9 °C (98.4 °F)-37.7 °C (99.8 °F)] 37.2 °C (98.9 °F)  Pulse:  [77-90] 77  Resp:  [17-18] 18  BP: (116-129)/(79-87) 116/79  SpO2:  [96 %-100 %] 96 %  Physical Exam  General: well appearing, no apparent distress  Abdomen: soft, nontender, nondistended  Fundus: firm at level below umbilicus  Incision: not applicable, (vaginal delivery)  Perineum: Deferred  Extremities: symmetric, no peripheral edema, calves nontender    Current Facility-Administered Medications   Medication Dose    LR infusion      oxytocin (Pitocin) infusion (for post delivery)  125 mL/hr    ropivacaine 0.2 % (Naropin) injection      lactated ringers infusion      docusate sodium (Colace) capsule 100 mg   100 mg    ibuprofen (Motrin) tablet 800 mg  800 mg    acetaminophen (Tylenol) tablet 1,000 mg  1,000 mg    tetanus-dipth-acell pertussis (Tdap) inj 0.5 mL  0.5 mL    measles, mumps and rubella vaccine (Mmr) injection 0.5 mL  0.5 mL    PRN oxytocin (PITOCIN) (20 Units/1000 mL) PRN for excessive uterine bleeding - See Admin Instr  125-999 mL/hr    miSOPROStol (Cytotec) tablet 600 mcg  600 mcg    methylergonovine (Methergine) injection 0.2 mg  0.2 mg    carboPROST (Hemabate) injection 250 mcg  250 mcg       Recent Labs     24  0325 24  1644   WBC 11.5* 14.2*   RBC 4.21 3.67*   HEMOGLOBIN 13.5 12.1   HEMATOCRIT 38.8 34.2*   MCV 92.2 93.2   MCH 32.1 33.0   MCHC 34.8 35.4   RDW 43.4 43.9   PLATELETCT 198 179   MPV 10.2 10.2         Activity/ Discharge Instructions::   Discharge to home  Pelvic Rest x 6 weeks  No heavy lifting x4 weeks  Call or come to ED for: heavy vaginal bleeding, fever >100.4, severe abdominal pain, severe headache, chest pain, shortness of breath,  N/V, incisional drainage, or other concerns.       Follow up:  [facility] in 5 weeks for vaginal delivery; 1 week for incision check for  delivery.     Discharge Meds:   Current Outpatient Medications   Medication Sig Dispense Refill    acetaminophen (TYLENOL) 500 MG Tab Take 2 Tablets by mouth every 6 hours as needed for Mild Pain. 30 Tablet 0    docusate sodium 100 MG Cap Take 100 mg by mouth 2 times a day as needed for Constipation. 60 Capsule 0    ibuprofen (MOTRIN) 800 MG Tab Take 1 Tablet by mouth every 8 hours as needed for Mild Pain. 30 Tablet 0           Donavan Morales M.D.   PGY-1, [facility]

## 2024-02-15 NOTE — DISCHARGE INSTRUCTIONS

## 2024-02-15 NOTE — PROGRESS NOTES
2000-Assessment done vital signs stable. Patient progressing according to plan of care. Fundus firm at 1 below the umbilicus with scant lochia. Patient up voiding without difficulty. Ambulating with steady gait. Claims to have good pain relief with p.o medications. Breast feeding infant on demand. Family at bedside. Patient denies problems at this time. Patient encouraged to call for any needs. Will continue to monitor.

## 2024-02-15 NOTE — CARE PLAN
The patient is Stable - Low risk of patient condition declining or worsening    Shift Goals  Clinical Goals: VS WDL  Patient Goals: Rest  Family Goals: Support    Progress made toward(s) clinical / shift goals:    Problem: Knowledge Deficit - Postpartum  Goal: Patient will verbalize and demonstrate understanding of self and infant care  Outcome: Progressing     Problem: Psychosocial - Postpartum  Goal: Patient will verbalize and demonstrate effective bonding and parenting behavior  Outcome: Progressing     Problem: Altered Physiologic Condition  Goal: Patient physiologically stable as evidenced by normal lochia, palpable uterine involution and vitals within normal limits  Outcome: Progressing     Problem: Infection - Postpartum  Goal: Postpartum patient will be free of signs and symptoms of infection  Outcome: Progressing       Patient is not progressing towards the following goals:

## 2024-02-15 NOTE — LACTATION NOTE
This note was copied from a baby's chart.  Follow up lactation support: Mom preparing for discharge today. LC assisted with latching o both side s and found FB hold works best for now. Baby latches easily and is eager . Frenulum tethering noted but mom denies discomfort, no clicking is heard an audible swallows in FB hold were present. Tether is more on in the middle of of the floor on baby's mouth and not close t gumline.  LC reviewed possible issue from a tight frenulum, however this baby appears to have good tongue lift and extends tongue well.   LC reviewed breast feeding basics with mom and provided outpatient resources.   Breastfeeding plan:    Feed baby with feeding cues and at least a minimum of 8x/24 hours.  Expect cluster feeding as this is normal during early days of life and growth spurts.  It is not recommended to let baby sleep longer than 4 hours between feedings and if sleepy, place skin to skin to promote feeding interest and milk production.  Baby's usually feed more frequently and longer when skin to skin with mother. It is not recommended to use pacifiers or supplementing with formula until breast feeding and milk production is well established or at least 4 weeks.    Make sure infant is getting enough by ensuring frequent feedings as well as looking for transitioning stools from dark meconium to bright yellow/green seedy loose stools by day 5.     Follow up with PEDS PCP as scheduled for weight checks and to make sure feeding is progressing appropriately.    Call for breastfeeding support through  [facility] (see information sheet) as needed and attend the BF Circles without need for appointment.

## 2024-02-15 NOTE — PROGRESS NOTES
0820 - Received report from Sophie ESQUEDA at change of shift. Assumed care. Assessment completed. Fundus firm, lochia scant. Plan of care reviewed. Pt will call if med intervention needed. Educated parents to offer feedings on cue, at minimum of Q3 hours and to update I&O chart. Educated parents on safe sleep and importance of keeping infant dressed and swaddled. Parents verbalized understanding. Encouraged parents to call for assistance when needed. Call light within reach. All questions and concerns answered at this time.     1330 - Discharge paperwork for pt and infant discussed with parents at bedside. All questions answered. Follow up appointment to be made by patient. NBS #2 dates given to parents. Parents verbalize understanding. Paperwork signed and dated at this time.     1407 - Infant discharged in car seat with parents. Infant placed in car seat by parents and checked by RN. Bands verified. Cuddles removed.

## 2024-02-16 NOTE — ANESTHESIA TIME REPORT
Anesthesia Start and Stop Event Times       Date Time Event    2/14/2024 0222 Ready for Procedure     0223 Anesthesia Start     0645 Anesthesia Stop          Responsible Staff  02/14/24      Name Role Begin End    Junior Baxter M.D. Anesth 0223 0645          Overtime Reason:  no overtime (within assigned shift)    Comments:

## 2024-02-16 NOTE — ANESTHESIA POSTPROCEDURE EVALUATION
Patient: Kusum Wade    Procedure Summary       Date: 02/14/24 Room / Location:     Anesthesia Start: 0223 Anesthesia Stop: 0645    Procedure: Labor Epidural Diagnosis:     Scheduled Providers:  Responsible Provider: Junior Baxter M.D.    Anesthesia Type: epidural ASA Status: 2            Final Anesthesia Type: epidural  Last vitals  BP        Temp        Pulse       Resp        SpO2          Anesthesia Post Evaluation    Patient location during evaluation: floor  Patient participation: complete - patient participated  Level of consciousness: awake and alert  Pain score: 2    Airway patency: patent  Anesthetic complications: no  Cardiovascular status: hemodynamically stable  Respiratory status: acceptable  Hydration status: euvolemic    PONV: none          There were no known notable events for this encounter.     Nurse Pain Score: 2 (NPRS)